# Patient Record
Sex: FEMALE | Race: OTHER | HISPANIC OR LATINO | URBAN - METROPOLITAN AREA
[De-identification: names, ages, dates, MRNs, and addresses within clinical notes are randomized per-mention and may not be internally consistent; named-entity substitution may affect disease eponyms.]

---

## 2023-04-24 ENCOUNTER — EMERGENCY (EMERGENCY)
Facility: HOSPITAL | Age: 48
LOS: 1 days | Discharge: HOME | End: 2023-04-24
Attending: EMERGENCY MEDICINE
Payer: MEDICAID

## 2023-04-24 VITALS
HEIGHT: 66 IN | TEMPERATURE: 98 F | WEIGHT: 153 LBS | OXYGEN SATURATION: 100 % | RESPIRATION RATE: 16 BRPM | SYSTOLIC BLOOD PRESSURE: 118 MMHG | DIASTOLIC BLOOD PRESSURE: 72 MMHG | HEART RATE: 74 BPM

## 2023-04-24 VITALS
DIASTOLIC BLOOD PRESSURE: 56 MMHG | SYSTOLIC BLOOD PRESSURE: 109 MMHG | RESPIRATION RATE: 16 BRPM | OXYGEN SATURATION: 100 % | HEART RATE: 67 BPM | TEMPERATURE: 98 F

## 2023-04-24 LAB
ALBUMIN SERPL ELPH-MCNC: 3.5 G/DL — SIGNIFICANT CHANGE UP (ref 3.5–5)
ALP SERPL-CCNC: 125 U/L — HIGH (ref 40–120)
ALT FLD-CCNC: 18 U/L DA — SIGNIFICANT CHANGE UP (ref 10–60)
ANION GAP SERPL CALC-SCNC: 4 MMOL/L — LOW (ref 5–17)
APTT BLD: 26.7 SEC — LOW (ref 27.5–35.5)
AST SERPL-CCNC: 19 U/L — SIGNIFICANT CHANGE UP (ref 10–40)
BASOPHILS # BLD AUTO: 0.06 K/UL — SIGNIFICANT CHANGE UP (ref 0–0.2)
BASOPHILS NFR BLD AUTO: 0.9 % — SIGNIFICANT CHANGE UP (ref 0–2)
BILIRUB SERPL-MCNC: 0.2 MG/DL — SIGNIFICANT CHANGE UP (ref 0.2–1.2)
BUN SERPL-MCNC: 22 MG/DL — HIGH (ref 7–18)
CALCIUM SERPL-MCNC: 9.2 MG/DL — SIGNIFICANT CHANGE UP (ref 8.4–10.5)
CHLORIDE SERPL-SCNC: 110 MMOL/L — HIGH (ref 96–108)
CO2 SERPL-SCNC: 23 MMOL/L — SIGNIFICANT CHANGE UP (ref 22–31)
CREAT SERPL-MCNC: 0.66 MG/DL — SIGNIFICANT CHANGE UP (ref 0.5–1.3)
EGFR: 109 ML/MIN/1.73M2 — SIGNIFICANT CHANGE UP
EOSINOPHIL # BLD AUTO: 0.17 K/UL — SIGNIFICANT CHANGE UP (ref 0–0.5)
EOSINOPHIL NFR BLD AUTO: 2.6 % — SIGNIFICANT CHANGE UP (ref 0–6)
GLUCOSE SERPL-MCNC: 97 MG/DL — SIGNIFICANT CHANGE UP (ref 70–99)
HCG SERPL-ACNC: <1 MIU/ML — SIGNIFICANT CHANGE UP
HCT VFR BLD CALC: 27.4 % — LOW (ref 34.5–45)
HGB BLD-MCNC: 7.4 G/DL — LOW (ref 11.5–15.5)
IMM GRANULOCYTES NFR BLD AUTO: 0.2 % — SIGNIFICANT CHANGE UP (ref 0–0.9)
INR BLD: 0.97 RATIO — SIGNIFICANT CHANGE UP (ref 0.88–1.16)
LYMPHOCYTES # BLD AUTO: 2.36 K/UL — SIGNIFICANT CHANGE UP (ref 1–3.3)
LYMPHOCYTES # BLD AUTO: 35.8 % — SIGNIFICANT CHANGE UP (ref 13–44)
MCHC RBC-ENTMCNC: 18 PG — LOW (ref 27–34)
MCHC RBC-ENTMCNC: 27 GM/DL — LOW (ref 32–36)
MCV RBC AUTO: 66.5 FL — LOW (ref 80–100)
MONOCYTES # BLD AUTO: 0.41 K/UL — SIGNIFICANT CHANGE UP (ref 0–0.9)
MONOCYTES NFR BLD AUTO: 6.2 % — SIGNIFICANT CHANGE UP (ref 2–14)
NEUTROPHILS # BLD AUTO: 3.58 K/UL — SIGNIFICANT CHANGE UP (ref 1.8–7.4)
NEUTROPHILS NFR BLD AUTO: 54.3 % — SIGNIFICANT CHANGE UP (ref 43–77)
NRBC # BLD: 0 /100 WBCS — SIGNIFICANT CHANGE UP (ref 0–0)
PLATELET # BLD AUTO: 452 K/UL — HIGH (ref 150–400)
POTASSIUM SERPL-MCNC: 4 MMOL/L — SIGNIFICANT CHANGE UP (ref 3.5–5.3)
POTASSIUM SERPL-SCNC: 4 MMOL/L — SIGNIFICANT CHANGE UP (ref 3.5–5.3)
PROT SERPL-MCNC: 7.8 G/DL — SIGNIFICANT CHANGE UP (ref 6–8.3)
PROTHROM AB SERPL-ACNC: 11.5 SEC — SIGNIFICANT CHANGE UP (ref 10.5–13.4)
RBC # BLD: 4.12 M/UL — SIGNIFICANT CHANGE UP (ref 3.8–5.2)
RBC # FLD: 18.9 % — HIGH (ref 10.3–14.5)
SODIUM SERPL-SCNC: 137 MMOL/L — SIGNIFICANT CHANGE UP (ref 135–145)
WBC # BLD: 6.59 K/UL — SIGNIFICANT CHANGE UP (ref 3.8–10.5)
WBC # FLD AUTO: 6.59 K/UL — SIGNIFICANT CHANGE UP (ref 3.8–10.5)

## 2023-04-24 PROCEDURE — 85730 THROMBOPLASTIN TIME PARTIAL: CPT

## 2023-04-24 PROCEDURE — 86850 RBC ANTIBODY SCREEN: CPT

## 2023-04-24 PROCEDURE — 85025 COMPLETE CBC W/AUTO DIFF WBC: CPT

## 2023-04-24 PROCEDURE — 99283 EMERGENCY DEPT VISIT LOW MDM: CPT

## 2023-04-24 PROCEDURE — 84702 CHORIONIC GONADOTROPIN TEST: CPT

## 2023-04-24 PROCEDURE — 85610 PROTHROMBIN TIME: CPT

## 2023-04-24 PROCEDURE — 99284 EMERGENCY DEPT VISIT MOD MDM: CPT

## 2023-04-24 PROCEDURE — 86900 BLOOD TYPING SEROLOGIC ABO: CPT

## 2023-04-24 PROCEDURE — 86901 BLOOD TYPING SEROLOGIC RH(D): CPT

## 2023-04-24 PROCEDURE — 80053 COMPREHEN METABOLIC PANEL: CPT

## 2023-04-24 PROCEDURE — 36415 COLL VENOUS BLD VENIPUNCTURE: CPT

## 2023-04-24 RX ORDER — ASCORBIC ACID 60 MG
1 TABLET,CHEWABLE ORAL
Qty: 30 | Refills: 0
Start: 2023-04-24 | End: 2023-05-23

## 2023-04-24 RX ORDER — DOCUSATE SODIUM 100 MG
1 CAPSULE ORAL
Qty: 60 | Refills: 0
Start: 2023-04-24 | End: 2023-05-23

## 2023-04-24 RX ORDER — FERROUS SULFATE 325(65) MG
1 TABLET ORAL
Qty: 30 | Refills: 0
Start: 2023-04-24 | End: 2023-05-23

## 2023-04-24 NOTE — ED PROVIDER NOTE - OBJECTIVE STATEMENT
47 year old female denies PMH coming in with anemia on outpatient labs. pt states she does have a history of heavy menses (states large clots). last visit to OBGYN 2 years ago. On PMD labs decrease in HGB so sent to ED for eval. states no vaginal bleeding at this time. denies all other complaints at this time.

## 2023-04-24 NOTE — ED PROVIDER NOTE - NSFOLLOWUPINSTRUCTIONS_ED_ALL_ED_FT
Anemia  Anemia    La anemia es marsha afección en la que no hay marsha cantidad suficiente de glóbulos rojos o hemoglobina en la yemi. La hemoglobina es la sustancia de los glóbulos rojos que transporta el oxígeno.    Cuando no hay suficientes glóbulos rojos o hemoglobina (está anémico), oropeza cuerpo no puede recibir el oxígeno suficiente, y es posible que preethi órganos no funcionen correctamente. Bowen resultado, es posible que se sienta muy cansado o sufra otros problemas.    ¿Cuáles son las causas?  Las causas más frecuentes de anemia son:  Sangrado excesivo. La anemia puede ser causada por un sangrado excesivo dentro o fuera del cuerpo, incluido sangrado de los intestinos o a causa de períodos menstruales abundantes en las mujeres.  Nutrición deficiente.  Enfermedad hepática, tiroidea o renal (crónicas).  Trastornos de la médula ósea, problemas en el bazo y trastornos de la yemi.  Cáncer y tratamientos para el cáncer.  VIH (virus de inmunodeficiencia humana) y SIDA (síndrome de inmunodeficiencia adquirida).  Infecciones, medicamentos y enfermedades autoinmunes que destruyen los glóbulos rojos.  ¿Cuáles son los signos o síntomas?  Los síntomas de esta afección incluyen:  Debilidad leve.  Mareos.  Dolor de fede o dificultad para concentrarse y dormir.  Latidos cardíacos irregulares o más rápidos que lo normal (palpitaciones).  Falta de aire, especialmente con el ejercicio.  Piel, labios y uñas pálidos, o ede y pies fríos.  Indigestión y náuseas.  Los síntomas pueden ocurrir repentinamente o manifestarse lentamente. Si la anemia es leve, es posible que no tenga síntomas.    ¿Cómo se diagnostica?  Esta afección se diagnostica en función de análisis de yemi, los antecedentes médicos y un examen físico. En algunos casos, se puede necesitar marsha prueba en la que se extraen células del tejido blando que está dentro de un hueso y se las observa con un microscopio (biopsia de médula ósea). Además, el médico puede controlar si hay yemi en preethi heces (materia fecal) y realizar análisis adicionales para detectar la causa del sangrado.    Otras pruebas que pueden realizarle son las siguientes:  Pruebas de diagnóstico por imágenes, bowen marsha resonancia magnética (RM) o marsha exploración por tomografía computarizada (TC).  Un procedimiento para examinar el interior del esófago y el estómago (endoscopía).  Un procedimiento para examinar el interior del colon y el recto (colonoscopía).  ¿Cómo se trata?  El tratamiento de esta afección depende de la causa. Si continúa perdiendo mucha yemi, es posible que necesite recibir tratamiento en un hospital. El tratamiento puede incluir:  Martine suplementos de sid, vitamina B12 o ácido fólico.  Amrtine un medicamento para las hormonas (eritropoyetina) que puede ayudar a estimular el desarrollo de glóbulos rojos.  Recibir marsha transfusión de yemi. Esta será necesaria si pierde mucha yemi.  Realizar cambios en la dieta.  Someterse a marsha cirugía para extirpar el bazo.  Siga estas instrucciones en oropeza casa:  Use los medicamentos de venta joel y los recetados solamente bowen se lo haya indicado el médico.  [place] los suplementos solamente bowen se lo haya indicado el médico.  Siga las instrucciones del médico en lo que respecta a la dieta.  Concurra a todas las visitas de seguimiento bowen se lo haya indicado el médico. [place] es importante.  Comuníquese con un médico si:  Tiene nuevos sangrados en cualquier parte del cuerpo.  Solicite ayuda de inmediato si:  Se siente muy débil.  Le falta el aire.  Siente dolor en la espalda, el abdomen o el pecho.  Se siente mareado o sufre un desmayo.  Tiene dificultad para concentrarse.  Preethi heces tienen yemi, son negras o son alquitranadas.  Vomita repetidamente o vomita yemi.  Estos síntomas pueden representar un problema grave que constituye marsha emergencia. No espere a carl si los síntomas desaparecen. Solicite atención médica de inmediato. Comuníquese con el servicio de emergencias de oropeza localidad (911 en los [place]). No conduzca por preethi propios medios hasta el hospital.    Resumen  La anemia es marsha afección en la que no hay suficientes glóbulos rojos o la cantidad suficiente de la sustancia de los glóbulos rojos que transporta el oxígeno (hemoglobina).  Los síntomas pueden ocurrir repentinamente o manifestarse lentamente.  Si la anemia es leve, es posible que no tenga síntomas.  Esta afección se diagnostica mediante análisis de yemi, los antecedentes médicos y un examen físico. Pueden ser necesarios otros estudios.  El tratamiento de esta afección depende de la causa de la anemia.  Esta información no tiene bowen fin reemplazar el consejo del médico. Asegúrese de hacerle al médico cualquier pregunta que tenga.    Document Revised: 01/21/2021 Document Reviewed: 01/21/2021

## 2023-04-24 NOTE — ED ADULT TRIAGE NOTE - CHIEF COMPLAINT QUOTE
sent from [facility] for low Hgb 7.4  and possible blood transfusion.

## 2023-04-24 NOTE — ED ADULT NURSE NOTE - OBJECTIVE STATEMENT
sent from [facility] for low Hgb 7.4  and possible blood transfusion .no sob noted

## 2023-04-24 NOTE — ED PROVIDER NOTE - PROGRESS NOTE DETAILS
hgb 7.4, unchanged from outpatient labs. no active bleeding. will rx iron pills. pt already made an appt with OBGYN. also advised to f/u with heme/onc. return precautions discussed.

## 2023-04-24 NOTE — ED ADULT NURSE NOTE - NS ED NURSE LEVEL OF CONSCIOUSNESS ORIENTATION
Clear bilaterally, pupils equal, round and reactive to light. Oriented - self; Oriented - place; Oriented - time

## 2023-04-28 NOTE — ED ADULT NURSE NOTE - CHIEF COMPLAINT
[FreeTextEntry1] : 04/25/23\par oc mri of the right knee:\par Impression: \par 1. High-grade partial tearing of the posterior cruciate ligament which appears thickened and T2 hyperintense \par with attenuation and severe surrounding synovitis particularly involving [id number] fibers.\par 2. Mild ACL sprain, mild MCL sprain, and mild fibular collateral ligament sprain with moderate effusion and \par synovitis and mild-to-moderate soft tissue swelling and bursitis anteriorly with mild posterior lateral muscle \par strains.\par 3. Medial meniscal degeneration without tear.\par 4. No acute osseous injury.\par 5. Clinical correlation regarding posterior instability and clinical follow up is recommended.
The patient is a 47y Female complaining of abnormal lab result.

## 2024-02-23 NOTE — ED ADULT NURSE NOTE - BREATHING, MLM
Addended by: DMITRIY CRUZ on: 2/23/2024 02:20 PM     Modules accepted: Orders    
Spontaneous, unlabored and symmetrical